# Patient Record
Sex: FEMALE | Race: WHITE | NOT HISPANIC OR LATINO | Employment: FULL TIME | URBAN - METROPOLITAN AREA
[De-identification: names, ages, dates, MRNs, and addresses within clinical notes are randomized per-mention and may not be internally consistent; named-entity substitution may affect disease eponyms.]

---

## 2017-01-28 ENCOUNTER — HISTORICAL (OUTPATIENT)
Dept: URGENT CARE | Facility: CLINIC | Age: 67
End: 2017-01-28

## 2018-02-13 LAB
INFLUENZA A ANTIGEN, POC: NEGATIVE
INFLUENZA B ANTIGEN, POC: NEGATIVE
RAPID GROUP A STREP (OHS): NEGATIVE

## 2018-12-22 LAB
BILIRUB SERPL-MCNC: NEGATIVE MG/DL
BLOOD URINE, POC: NEGATIVE
CLARITY, POC UA: CLEAR
COLOR, POC UA: NORMAL
GLUCOSE UR QL STRIP: NEGATIVE
KETONES UR QL STRIP: NEGATIVE
LEUKOCYTE EST, POC UA: NORMAL
NITRITE, POC UA: NEGATIVE
PH, POC UA: 6
PROTEIN, POC: NEGATIVE
SPECIFIC GRAVITY, POC UA: 1

## 2019-09-03 ENCOUNTER — HISTORICAL (OUTPATIENT)
Dept: LAB | Facility: HOSPITAL | Age: 69
End: 2019-09-03

## 2019-09-03 LAB
ABS NEUT (OLG): 3.7 X10(3)/MCL (ref 1.5–6.9)
ALBUMIN SERPL-MCNC: 4.1 GM/DL (ref 3.4–5)
ALBUMIN/GLOB SERPL: 1.1 RATIO
ALP SERPL-CCNC: 69 UNIT/L (ref 30–113)
ALT SERPL-CCNC: 32 UNIT/L (ref 10–45)
AST SERPL-CCNC: 19 UNIT/L (ref 15–37)
BASOPHILS # BLD AUTO: 0 X10(3)/MCL (ref 0–0.1)
BASOPHILS NFR BLD AUTO: 0 % (ref 0–1)
BILIRUB SERPL-MCNC: 0.5 MG/DL (ref 0.1–0.9)
BILIRUBIN DIRECT+TOT PNL SERPL-MCNC: 0.1 MG/DL (ref 0–0.3)
BILIRUBIN DIRECT+TOT PNL SERPL-MCNC: 0.4 MG/DL
BUN SERPL-MCNC: 16 MG/DL (ref 10–20)
CALCIUM SERPL-MCNC: 9.6 MG/DL (ref 8–10.5)
CHLORIDE SERPL-SCNC: 105 MMOL/L (ref 100–108)
CHOLEST SERPL-MCNC: 194 MG/DL (ref 140–200)
CHOLEST/HDLC SERPL: 3 MG/DL (ref 0–8)
CO2 SERPL-SCNC: 28 MMOL/L (ref 21–35)
CREAT SERPL-MCNC: 0.89 MG/DL (ref 0.7–1.3)
EOSINOPHIL # BLD AUTO: 0.1 X10(3)/MCL (ref 0–0.6)
EOSINOPHIL NFR BLD AUTO: 2 % (ref 0–5)
ERYTHROCYTE [DISTWIDTH] IN BLOOD BY AUTOMATED COUNT: 12.3 % (ref 11.5–17)
EST. AVERAGE GLUCOSE BLD GHB EST-MCNC: 108 MG/DL
GLOBULIN SER-MCNC: 3.8 GM/DL
GLUCOSE SERPL-MCNC: 108 MG/DL (ref 75–116)
HBA1C MFR BLD: 5.4 % (ref 4.8–6)
HCT VFR BLD AUTO: 41.8 % (ref 36–48)
HDLC SERPL-MCNC: 60 MG/DL (ref 35–59)
HGB BLD-MCNC: 13.7 GM/DL (ref 12–16)
IMM GRANULOCYTES # BLD AUTO: 0.02 10*3/UL (ref 0–0.02)
IMM GRANULOCYTES NFR BLD AUTO: 0.3 % (ref 0–0.43)
LDLC SERPL CALC-MCNC: 108 MG/DL (ref 100–129)
LYMPHOCYTES # BLD AUTO: 1.6 X10(3)/MCL (ref 0.5–4.1)
LYMPHOCYTES NFR BLD AUTO: 26 % (ref 15–40)
MCH RBC QN AUTO: 30 PG (ref 27–34)
MCHC RBC AUTO-ENTMCNC: 33 GM/DL (ref 31–36)
MCV RBC AUTO: 91 FL (ref 80–99)
MONOCYTES # BLD AUTO: 0.6 X10(3)/MCL (ref 0–1.1)
MONOCYTES NFR BLD AUTO: 11 % (ref 4–12)
NEUTROPHILS # BLD AUTO: 3.7 X10(3)/MCL (ref 1.5–6.9)
NEUTROPHILS NFR BLD AUTO: 60 % (ref 43–75)
PLATELET # BLD AUTO: 217 X10(3)/MCL (ref 140–400)
PMV BLD AUTO: 9.6 FL (ref 6.8–10)
POTASSIUM SERPL-SCNC: 4.5 MMOL/L (ref 3.6–5.2)
PROT SERPL-MCNC: 7.9 GM/DL (ref 6.4–8.2)
RBC # BLD AUTO: 4.59 X10(6)/MCL (ref 4.2–5.4)
SODIUM SERPL-SCNC: 141 MMOL/L (ref 135–145)
T4 FREE SERPL-MCNC: 0.99 NG/DL (ref 0.76–1.46)
TRIGL SERPL-MCNC: 164 MG/DL (ref 35–150)
TSH SERPL-ACNC: 1.55 MIU/ML (ref 0.36–3.74)
VLDLC SERPL CALC-MCNC: 33 MG/DL
WBC # SPEC AUTO: 6.1 X10(3)/MCL (ref 4.5–11.5)

## 2019-11-19 ENCOUNTER — HISTORICAL (OUTPATIENT)
Dept: RADIOLOGY | Facility: HOSPITAL | Age: 69
End: 2019-11-19

## 2021-02-08 ENCOUNTER — HISTORICAL (OUTPATIENT)
Dept: ADMINISTRATIVE | Facility: HOSPITAL | Age: 71
End: 2021-02-08

## 2021-03-08 ENCOUNTER — HISTORICAL (OUTPATIENT)
Dept: ADMINISTRATIVE | Facility: HOSPITAL | Age: 71
End: 2021-03-08

## 2021-08-11 ENCOUNTER — HISTORICAL (OUTPATIENT)
Dept: RADIOLOGY | Facility: HOSPITAL | Age: 71
End: 2021-08-11

## 2021-08-19 ENCOUNTER — HISTORICAL (OUTPATIENT)
Dept: RADIOLOGY | Facility: HOSPITAL | Age: 71
End: 2021-08-19

## 2021-10-23 ENCOUNTER — HISTORICAL (OUTPATIENT)
Dept: URGENT CARE | Facility: CLINIC | Age: 71
End: 2021-10-23

## 2021-10-23 LAB
BILIRUB SERPL-MCNC: NEGATIVE MG/DL
BLOOD URINE, POC: NEGATIVE
CLARITY, POC UA: NORMAL
COLOR, POC UA: NORMAL
GLUCOSE UR QL STRIP: NEGATIVE
KETONES UR QL STRIP: NEGATIVE
LEUKOCYTE EST, POC UA: NORMAL
NITRITE, POC UA: NEGATIVE
PH, POC UA: 6.5
PROTEIN, POC: NEGATIVE
SPECIFIC GRAVITY, POC UA: 1.01
UROBILINOGEN, POC UA: NORMAL

## 2021-12-29 ENCOUNTER — HISTORICAL (OUTPATIENT)
Dept: LAB | Facility: HOSPITAL | Age: 71
End: 2021-12-29

## 2022-04-11 ENCOUNTER — HISTORICAL (OUTPATIENT)
Dept: ADMINISTRATIVE | Facility: HOSPITAL | Age: 72
End: 2022-04-11

## 2022-04-24 VITALS
SYSTOLIC BLOOD PRESSURE: 149 MMHG | DIASTOLIC BLOOD PRESSURE: 87 MMHG | HEIGHT: 68 IN | BODY MASS INDEX: 22.86 KG/M2 | OXYGEN SATURATION: 98 % | WEIGHT: 150.81 LBS

## 2022-04-30 NOTE — OP NOTE
Patient:   Leora Irizarry             MRN: [id number]            FIN: [id number]               Age:   71 years     Sex:  Female     :  1950   Associated Diagnoses:   None   Author:   Alyson MEYER, Mary JANG AME: Leora Irizarry  SURGEON:  Mary Shields MD    YOB: 1950  DATE OF SURGERY:2021    PREOPERATIVE DIAGNOSIS:  Cataract, right eye.    POSTOPERATIVE DIAGNOSIS:  Cataract, right eye.    PROCEDURE:  Cataract extraction with intraocular lens placement, right eye.    HISTORY:  The patient is a 71 year-old female, who presented to the clinic with a 2+ nuclear sclerotic cataract causing difficulty in patient's activities of daily living. After thorough discussion of risks, benefits, alternatives and complications, the patient expressed understanding and wished to proceed with cataract extraction.  Patient has decided to proceed with Catalys FLACS, LRI, monofocal IOL in the operative eye with goal of distance.    PROCEDURE IN DETAIL:  On the day of surgery patient was brought to the preoperative holding area, where patient was given five drops each of phenylephrine, tropicamide and Cyclopentolate into the operative eye.    Patient was then brought to the laser suite, where Marcaine drops were placed in operative eye and toric markings were made.  Patient was then positioned under the laser, where capsulotomy, LRI and fragmentation were performed.  Patient was then brought to the operating room where patient was given Marcaine drops into the operative eye.  Patient was prepped and draped in normal sterile fashion.  A lid speculum was inserted.  Using operating microscope, 1.0-mm keratome was used to create a side port wound through which 1% epishugarcaine was injected, followed by Viscoat.  A 2.4 mm keratome was used to create triplanar phacoemulsification wound, through which continuous tear capsulorrhexis was performed using cystotome and Utrata forceps. Hydrodissection and  delineation were performed until lens was rotating freely in capsular bag.  Phacoemulsification was carried out in divide and conquer fashion to remove nuclear and epinuclear fragments.  I/A was used to remove cortex carefully.  Healon was injected into the capsular bag, which was found to be free of tears or defect.  A ZCBOO+24.0 diopter lens was inserted and carefully rotated into place.  Viscoelastic was removed from the eye.  Wounds were sealed using hydration.  Lens was in excellent position at end of case.  LRI were opened with sinskey hook.  Gati/Pred/Brom drops were placed into the patient's eye, which was patched and shielded.  The patient tolerated the procedure well and will followup tomorrow in clinic.

## 2022-04-30 NOTE — OP NOTE
Patient:   Leora Irizarry             MRN: [id number]            FIN: [id number]               Age:   71 years     Sex:  Female     :  1950   Associated Diagnoses:   None   Author:   Alyson MEYER, Mary JANG AME: Leora Irizarry  SURGEON:  Mary Shields MD    YOB: 1950  DATE OF SURGERY:3/8/2021    PREOPERATIVE DIAGNOSIS:  Cataract, left eye.    POSTOPERATIVE DIAGNOSIS:  Cataract, left eye.    PROCEDURE:  Cataract extraction with intraocular lens placement, left eye.    HISTORY:  The patient is a 71 year-old female, who presented to the clinic with a 2+ nuclear sclerotic cataract causing difficulty in patient's activities of daily living. After thorough discussion of risks, benefits, alternatives and complications, the patient expressed understanding and wished to proceed with cataract extraction.  Patient has decided to proceed with Catalys FLACS, LRI, monofocal IOL in the operative eye with goal of distance.    PROCEDURE IN DETAIL:  On the day of surgery patient was brought to the preoperative holding area, where patient was given five drops each of phenylephrine, tropicamide and Cyclopentolate into the operative eye.    Patient was then brought to the laser suite, where Marcaine drops were placed in operative eye and toric markings were made.  Patient was then positioned under the laser, where capsulotomy, LRI and fragmentation were performed.  Patient was then brought to the operating room where patient was given Marcaine drops into the operative eye.  Patient was prepped and draped in normal sterile fashion.  A lid speculum was inserted.  Using operating microscope, 1.0-mm keratome was used to create a side port wound through which 1% epishugarcaine was injected, followed by Viscoat.  A 2.6 mm keratome was used to create triplanar phacoemulsification wound, through which continuous tear capsulorrhexis was performed using cystotome and Utrata forceps. Hydrodissection and  delineation were performed until lens was rotating freely in capsular bag.  Phacoemulsification was carried out in divide and conquer fashion to remove nuclear and epinuclear fragments.  I/A was used to remove cortex carefully.  Healon was injected into the capsular bag, which was found to be free of tears or defect.  A ZCBOO+23.5 diopter lens was inserted and carefully rotated into place.  Viscoelastic was removed from the eye.  Wounds were sealed using hydration.  Lens was in excellent position at end of case.  LRI were opened with sinskey hook.  Maxitrol ointment was placed into the patient's eye, which was patched and shielded.  The patient tolerated the procedure well and will followup tomorrow in clinic.

## 2022-08-22 DIAGNOSIS — Z12.31 ENCOUNTER FOR SCREENING MAMMOGRAM FOR MALIGNANT NEOPLASM OF BREAST: Primary | ICD-10-CM

## 2022-08-29 ENCOUNTER — OFFICE VISIT (OUTPATIENT)
Dept: URGENT CARE | Facility: CLINIC | Age: 72
End: 2022-08-29
Payer: MEDICARE

## 2022-08-29 VITALS
OXYGEN SATURATION: 96 % | SYSTOLIC BLOOD PRESSURE: 153 MMHG | WEIGHT: 147 LBS | RESPIRATION RATE: 18 BRPM | TEMPERATURE: 98 F | HEART RATE: 53 BPM | DIASTOLIC BLOOD PRESSURE: 86 MMHG | HEIGHT: 67 IN | BODY MASS INDEX: 23.07 KG/M2

## 2022-08-29 DIAGNOSIS — R35.0 FREQUENT URINATION: ICD-10-CM

## 2022-08-29 DIAGNOSIS — R30.9 PAIN WITH URINATION: Primary | ICD-10-CM

## 2022-08-29 DIAGNOSIS — N39.0 URINARY TRACT INFECTION WITHOUT HEMATURIA, SITE UNSPECIFIED: ICD-10-CM

## 2022-08-29 LAB
BILIRUB UR QL STRIP: NEGATIVE
GLUCOSE UR QL STRIP: NEGATIVE
KETONES UR QL STRIP: NEGATIVE
LEUKOCYTE ESTERASE UR QL STRIP: POSITIVE
PH, POC UA: 6
POC BLOOD, URINE: NEGATIVE
POC NITRATES, URINE: NEGATIVE
PROT UR QL STRIP: NEGATIVE
SP GR UR STRIP: 1 (ref 1–1.03)
UROBILINOGEN UR STRIP-ACNC: NORMAL (ref 0.1–1.1)

## 2022-08-29 PROCEDURE — 87077 CULTURE AEROBIC IDENTIFY: CPT | Performed by: PHYSICIAN ASSISTANT

## 2022-08-29 PROCEDURE — 99213 OFFICE O/P EST LOW 20 MIN: CPT | Mod: ,,, | Performed by: PHYSICIAN ASSISTANT

## 2022-08-29 PROCEDURE — 81003 POCT URINALYSIS, DIPSTICK, AUTOMATED, W/O SCOPE: ICD-10-PCS | Mod: QW,,, | Performed by: PHYSICIAN ASSISTANT

## 2022-08-29 PROCEDURE — 81003 URINALYSIS AUTO W/O SCOPE: CPT | Mod: QW,,, | Performed by: PHYSICIAN ASSISTANT

## 2022-08-29 PROCEDURE — 99213 PR OFFICE/OUTPT VISIT, EST, LEVL III, 20-29 MIN: ICD-10-PCS | Mod: ,,, | Performed by: PHYSICIAN ASSISTANT

## 2022-08-29 RX ORDER — ATORVASTATIN CALCIUM 40 MG/1
40 TABLET, FILM COATED ORAL DAILY
COMMUNITY
Start: 2022-06-20

## 2022-08-29 RX ORDER — DILTIAZEM HYDROCHLORIDE 240 MG/1
240 CAPSULE, COATED, EXTENDED RELEASE ORAL DAILY
COMMUNITY
Start: 2022-07-18

## 2022-08-29 RX ORDER — CEFDINIR 300 MG/1
300 CAPSULE ORAL 2 TIMES DAILY
Qty: 10 CAPSULE | Refills: 0 | Status: SHIPPED | OUTPATIENT
Start: 2022-08-29 | End: 2022-09-03

## 2022-08-29 RX ORDER — LEVOTHYROXINE SODIUM 100 UG/1
100 TABLET ORAL DAILY
COMMUNITY
Start: 2022-08-07

## 2022-08-29 RX ORDER — METOPROLOL TARTRATE 25 MG/1
TABLET, FILM COATED ORAL
COMMUNITY
Start: 2022-08-07

## 2022-08-29 RX ORDER — ZOLPIDEM TARTRATE 10 MG/1
10 TABLET ORAL NIGHTLY
COMMUNITY
Start: 2022-06-24

## 2022-08-29 RX ORDER — OMEPRAZOLE 40 MG/1
40 CAPSULE, DELAYED RELEASE ORAL EVERY MORNING
COMMUNITY
Start: 2022-07-08

## 2022-08-29 NOTE — PATIENT INSTRUCTIONS
Drink plenty of fluids  Get plenty of rest    Follow up with your primary care doctor.  Go to the emergency department with any significant change or worsening of symptoms.

## 2022-08-29 NOTE — PROGRESS NOTES
"Subjective:       Patient ID: Leora Irizarry is a 72 y.o. female.    Vitals:  height is 5' 7" (1.702 m) and weight is 66.7 kg (147 lb). Her oral temperature is 98 °F (36.7 °C). Her blood pressure is 153/86 (abnormal) and her pulse is 53 (abnormal). Her respiration is 18 and oxygen saturation is 96%.     Chief Complaint: Dysuria    Pt is a 72 yr old female that presets to clinic with frequent urination, and dysuria x yesterday.  Denies any fever flank pain vomiting bowel changes or vaginal symptoms.    Urinary Tract Infection   This is a new problem. The current episode started yesterday. The problem occurs intermittently. The problem has been unchanged. The pain is at a severity of 5/10. The pain is mild. There has been no fever. Associated symptoms include flank pain. She has tried antibiotics for the symptoms. The treatment provided mild relief.     Genitourinary:  Positive for flank pain.     Objective:      Physical Exam   Constitutional: She is oriented to person, place, and time. She appears well-developed.   HENT:   Head: Normocephalic and atraumatic.   Ears:   Right Ear: External ear normal.   Left Ear: External ear normal.   Nose: Nose normal. No nasal deformity. No epistaxis.   Mouth/Throat: Oropharynx is clear and moist and mucous membranes are normal.   Eyes: Lids are normal.   Neck: Trachea normal and phonation normal. Neck supple.   Cardiovascular: Normal pulses.   Pulmonary/Chest: Effort normal.   Abdominal: Normal appearance and bowel sounds are normal. She exhibits no distension. Soft. There is no abdominal tenderness. There is no left CVA tenderness and no right CVA tenderness.   Neurological: She is alert and oriented to person, place, and time.   Skin: Skin is warm, dry and intact.   Psychiatric: Her speech is normal and behavior is normal.   Nursing note and vitals reviewed.       Previous History      Review of patient's allergies indicates:   Allergen Reactions    Codeine Nausea Only " "      Past Medical History:   Diagnosis Date    Acid reflux     Heart murmur     Hyperthyroidism     Sleep apnea, unspecified      Current Outpatient Medications   Medication Instructions    atorvastatin (LIPITOR) 40 mg, Oral, Daily    cefdinir (OMNICEF) 300 mg, Oral, 2 times daily    diltiaZEM (CARDIZEM CD) 240 mg, Oral, Daily    levothyroxine (SYNTHROID) 100 mcg, Oral, Daily    metoprolol tartrate (LOPRESSOR) 25 MG tablet SMARTSI.25-0.5 Tablet(s) By Mouth Twice Daily    omeprazole (PRILOSEC) 40 mg, Oral, Every morning    zolpidem (AMBIEN) 10 mg, Oral, Nightly     Past Surgical History:   Procedure Laterality Date    BREAST SURGERY      CATARACT EXTRACTION Bilateral      No family history on file.    Social History     Tobacco Use    Smoking status: Never    Smokeless tobacco: Never   Substance Use Topics    Alcohol use: Not Currently     Alcohol/week: 1.0 standard drink     Types: 1 Cans of beer per week    Drug use: Never        Physical Exam      Vital Signs Reviewed   BP (!) 153/86   Pulse (!) 53   Temp 98 °F (36.7 °C) (Oral)   Resp 18   Ht 5' 7" (1.702 m)   Wt 66.7 kg (147 lb)   SpO2 96%   BMI 23.02 kg/m²        Procedures    Procedures     Labs     Results for orders placed or performed in visit on 22   POCT Urinalysis, Dipstick, Automated, W/O Scope   Result Value Ref Range    POC Blood, Urine Negative Negative    POC Bilirubin, Urine Negative Negative    POC Urobilinogen, Urine Normal 0.1 - 1.1    POC Ketones, Urine Negative Negative    POC Protein, Urine Negative Negative    POC Nitrates, Urine Negative Negative    POC Glucose, Urine Negative Negative    pH, UA 6     POC Specific Gravity, Urine 1.005 1.003 - 1.029    POC Leukocytes, Urine Positive (A) Negative         Assessment:       1. Pain with urination    2. Frequent urination    3. Urinary tract infection without hematuria, site unspecified          Plan:         Pain with urination  -     POCT Urinalysis, Dipstick, Automated, W/O " Scope    Frequent urination  -     POCT Urinalysis, Dipstick, Automated, W/O Scope    Urinary tract infection without hematuria, site unspecified  -     cefdinir (OMNICEF) 300 MG capsule; Take 1 capsule (300 mg total) by mouth 2 (two) times daily. for 5 days  Dispense: 10 capsule; Refill: 0  -     Urine culture       Drink plenty of fluids  Get plenty of rest    Follow up with your primary care doctor.  Go to the emergency department with any significant change or worsening of symptoms.

## 2022-08-31 LAB — BACTERIA UR CULT: ABNORMAL

## 2022-09-21 ENCOUNTER — HISTORICAL (OUTPATIENT)
Dept: ADMINISTRATIVE | Facility: HOSPITAL | Age: 72
End: 2022-09-21
Payer: MEDICARE

## 2022-11-28 PROBLEM — N39.0 URINARY TRACT INFECTION WITHOUT HEMATURIA: Status: RESOLVED | Noted: 2022-08-29 | Resolved: 2022-11-28

## 2023-05-23 ENCOUNTER — PATIENT MESSAGE (OUTPATIENT)
Dept: RESEARCH | Facility: HOSPITAL | Age: 73
End: 2023-05-23
Payer: MEDICARE

## 2023-10-19 DIAGNOSIS — Z12.31 OTHER SCREENING MAMMOGRAM: Primary | ICD-10-CM

## 2025-02-16 ENCOUNTER — OFFICE VISIT (OUTPATIENT)
Dept: URGENT CARE | Facility: CLINIC | Age: 75
End: 2025-02-16
Payer: MEDICARE

## 2025-02-16 VITALS
HEART RATE: 50 BPM | OXYGEN SATURATION: 100 % | TEMPERATURE: 98 F | DIASTOLIC BLOOD PRESSURE: 78 MMHG | SYSTOLIC BLOOD PRESSURE: 135 MMHG | HEIGHT: 67 IN | WEIGHT: 147 LBS | RESPIRATION RATE: 20 BRPM | BODY MASS INDEX: 23.07 KG/M2

## 2025-02-16 DIAGNOSIS — N39.0 URINARY TRACT INFECTION WITHOUT HEMATURIA, SITE UNSPECIFIED: Primary | ICD-10-CM

## 2025-02-16 DIAGNOSIS — R35.0 URINARY FREQUENCY: ICD-10-CM

## 2025-02-16 PROCEDURE — 87086 URINE CULTURE/COLONY COUNT: CPT | Performed by: CLINIC/CENTER

## 2025-02-16 RX ORDER — SULFAMETHOXAZOLE AND TRIMETHOPRIM 800; 160 MG/1; MG/1
1 TABLET ORAL 2 TIMES DAILY
Qty: 14 TABLET | Refills: 0 | Status: SHIPPED | OUTPATIENT
Start: 2025-02-16 | End: 2025-02-23

## 2025-02-16 NOTE — PROGRESS NOTES
Subjective:      Patient ID: Leora Irizarry is a 75 y.o. female.    Vitals:  vitals were not taken for this visit.     Chief Complaint: Urinary Frequency     Patient is a 75 y.o. female who presents to urgent care with complaints of pain with urination, lower abdominal pain, strong smell x2 days. Alleviating factors include AZO with no relief. Patient denies N/V/D HA BA fever, belly pain, shortness of breath or chest pain      Urinary Frequency   Associated symptoms include frequency.       Genitourinary:  Positive for dysuria and frequency.      Objective:     Physical Exam   Constitutional: She is oriented to person, place, and time. She appears well-developed. She is cooperative.   HENT:   Head: Normocephalic and atraumatic.   Ears:   Right Ear: Hearing, tympanic membrane, external ear and ear canal normal.   Left Ear: Hearing, tympanic membrane, external ear and ear canal normal.   Nose: Nose normal. No mucosal edema or nasal deformity. No epistaxis. Right sinus exhibits no maxillary sinus tenderness and no frontal sinus tenderness. Left sinus exhibits no maxillary sinus tenderness and no frontal sinus tenderness.   Mouth/Throat: Uvula is midline, oropharynx is clear and moist and mucous membranes are normal. No trismus in the jaw. Normal dentition. No uvula swelling.   Eyes: Conjunctivae and lids are normal.   Neck: Trachea normal and phonation normal. Neck supple.   Cardiovascular: Normal rate, regular rhythm, normal heart sounds and normal pulses.   Pulmonary/Chest: Effort normal and breath sounds normal.   Abdominal: Normal appearance and bowel sounds are normal. She exhibits no distension. Soft. There is no abdominal tenderness. There is no rebound, no guarding, no left CVA tenderness and no right CVA tenderness.   Musculoskeletal: Normal range of motion.         General: Normal range of motion.   Neurological: She is alert and oriented to person, place, and time. She exhibits normal muscle tone.   Skin:  "Skin is warm, dry and intact.   Psychiatric: Her speech is normal and behavior is normal. Judgment and thought content normal.   Nursing note and vitals reviewed.         Previous History      Review of patient's allergies indicates:   Allergen Reactions    Codeine Nausea Only       Past Medical History:   Diagnosis Date    Acid reflux     Heart murmur     Hyperthyroidism     Sleep apnea, unspecified      Current Outpatient Medications   Medication Instructions    atorvastatin (LIPITOR) 40 mg, Daily    diltiaZEM (CARDIZEM CD) 240 mg, Daily    levothyroxine (SYNTHROID) 100 mcg, Daily    metoprolol tartrate (LOPRESSOR) 25 MG tablet SMARTSI.25-0.5 Tablet(s) By Mouth Twice Daily    omeprazole (PRILOSEC) 40 mg, Every morning    zolpidem (AMBIEN) 10 mg, Nightly     Past Surgical History:   Procedure Laterality Date    BREAST SURGERY      CATARACT EXTRACTION Bilateral      No family history on file.    Social History[1]     Physical Exam      Vital Signs Reviewed   /78   Pulse (!) 50   Temp 97.9 °F (36.6 °C) (Tympanic)   Resp 20   Ht 5' 7" (1.702 m)   Wt 66.7 kg (147 lb)   SpO2 100%   BMI 23.02 kg/m²        Procedures    Procedures     Labs     Results for orders placed or performed in visit on 25   POCT Urinalysis, Dipstick, Manual, w/o Scope    Collection Time: 25 12:19 PM   Result Value Ref Range    POC Blood, Urine Negative Negative    POC Bilirubin, Urine Negative Negative    POC Urobilinogen, Urine NORMAL 0.1 - 1.1    POC Ketones, Urine Negative Negative    POC Protein, Urine Negative Negative    POC Nitrates, Urine Negative Negative    POC Glucose, Urine Negative Negative    pH, UA 6     POC Specific Gravity, Urine 1.005 1.003 - 1.029    POC Leukocytes, Urine Positive (A) Negative      Assessment:     1. Urinary frequency      Patient's physical exam is completely benign.  Patient takes Cardizem which explains the heart rate in the 50s.  Patient denies any other symptoms sometimes " dysuria, foul-smelling urine, and urinary frequency.  Plan:   Complete full course of antibiotics.      Drink plenty of water daily. Avoid soda.    Follow up with your primary care doctor as needed.    Present to the nearest Emergency Department with any significant change or worsening of symptoms including but not limited to blood in urine, nausea/vomiting, abdominal pain, fever, body aches, chills, or flank pain.      Urinary frequency  -     POCT Urinalysis, Dipstick, Manual, w/o Scope                         [1]   Social History  Tobacco Use    Smoking status: Never    Smokeless tobacco: Never   Substance Use Topics    Alcohol use: Not Currently     Alcohol/week: 1.0 standard drink of alcohol     Types: 1 Cans of beer per week    Drug use: Never

## 2025-02-16 NOTE — PATIENT INSTRUCTIONS
Complete full course of antibiotics.      Drink plenty of water daily. Avoid soda.    Follow up with your primary care doctor as needed.    Present to the nearest Emergency Department with any significant change or worsening of symptoms including but not limited to blood in urine, nausea/vomiting, abdominal pain, fever, body aches, chills, or flank pain.

## 2025-02-17 ENCOUNTER — RESULTS FOLLOW-UP (OUTPATIENT)
Dept: URGENT CARE | Facility: CLINIC | Age: 75
End: 2025-02-17

## 2025-02-17 LAB — BACTERIA UR CULT: ABNORMAL
